# Patient Record
Sex: FEMALE
[De-identification: names, ages, dates, MRNs, and addresses within clinical notes are randomized per-mention and may not be internally consistent; named-entity substitution may affect disease eponyms.]

---

## 2021-09-14 ENCOUNTER — RESULT REVIEW (OUTPATIENT)
Age: 46
End: 2021-09-14

## 2022-10-20 ENCOUNTER — RESULT REVIEW (OUTPATIENT)
Age: 47
End: 2022-10-20

## 2022-11-07 PROBLEM — Z00.00 ENCOUNTER FOR PREVENTIVE HEALTH EXAMINATION: Status: ACTIVE | Noted: 2022-11-07

## 2022-11-11 ENCOUNTER — APPOINTMENT (OUTPATIENT)
Dept: ULTRASOUND IMAGING | Facility: CLINIC | Age: 47
End: 2022-11-11

## 2022-11-11 ENCOUNTER — OUTPATIENT (OUTPATIENT)
Dept: OUTPATIENT SERVICES | Facility: HOSPITAL | Age: 47
LOS: 1 days | End: 2022-11-11

## 2022-11-11 PROCEDURE — 76856 US EXAM PELVIC COMPLETE: CPT | Mod: 26

## 2022-11-11 PROCEDURE — 76830 TRANSVAGINAL US NON-OB: CPT | Mod: 26

## 2023-01-26 ENCOUNTER — ASOB RESULT (OUTPATIENT)
Age: 48
End: 2023-01-26

## 2023-01-26 ENCOUNTER — APPOINTMENT (OUTPATIENT)
Dept: ANTEPARTUM | Facility: CLINIC | Age: 48
End: 2023-01-26
Payer: COMMERCIAL

## 2023-01-26 PROCEDURE — 76376 3D RENDER W/INTRP POSTPROCES: CPT

## 2023-01-26 PROCEDURE — 76856 US EXAM PELVIC COMPLETE: CPT

## 2023-01-26 PROCEDURE — 76830 TRANSVAGINAL US NON-OB: CPT

## 2024-05-29 ENCOUNTER — APPOINTMENT (OUTPATIENT)
Dept: ORTHOPEDIC SURGERY | Facility: CLINIC | Age: 49
End: 2024-05-29
Payer: COMMERCIAL

## 2024-05-29 VITALS — RESPIRATION RATE: 16 BRPM | HEIGHT: 63 IN | WEIGHT: 145 LBS | BODY MASS INDEX: 25.69 KG/M2

## 2024-05-29 DIAGNOSIS — R22.31 LOCALIZED SWELLING, MASS AND LUMP, RIGHT UPPER LIMB: ICD-10-CM

## 2024-05-29 DIAGNOSIS — M65.4 RADIAL STYLOID TENOSYNOVITIS [DE QUERVAIN]: ICD-10-CM

## 2024-05-29 DIAGNOSIS — Z82.61 FAMILY HISTORY OF ARTHRITIS: ICD-10-CM

## 2024-05-29 DIAGNOSIS — Z87.891 PERSONAL HISTORY OF NICOTINE DEPENDENCE: ICD-10-CM

## 2024-05-29 PROCEDURE — 99204 OFFICE O/P NEW MOD 45 MIN: CPT | Mod: 25

## 2024-05-29 PROCEDURE — 76882 US LMTD JT/FCL EVL NVASC XTR: CPT

## 2024-05-29 PROCEDURE — 73110 X-RAY EXAM OF WRIST: CPT | Mod: 50

## 2024-05-29 PROCEDURE — 20612 ASPIRATE/INJ GANGLION CYST: CPT

## 2024-05-29 PROCEDURE — 20550 NJX 1 TENDON SHEATH/LIGAMENT: CPT | Mod: RT

## 2024-05-29 NOTE — PHYSICAL EXAM
[de-identified] : Physical exam shows the patient to be alert and oriented x3, capable of ambulation. The patient is well-developed and well-nourished in no apparent respiratory distress. Majority of the skin is intact bilaterally in the upper extremities without lymphadenopathy at the elbows.  There is a 5 x 5 mm soft tissue mass over the first dorsal compartment of the radial styloid of the right wrist.  There is a positive Finkelstein test but a negative grind test no tenderness over the STT or FCR tendon.  Symmetric and full range of motion of the wrist bilaterally without evidence of joint or tendon instability no tenderness over the MP or IP joint or A1 pulley.  There is good capillary refill of the digits bilaterally.There is no masses or sensitivity over the median and ulnar nerves at the level of the wrist. There is a negative Tinel's and negative Phalen's sign bilaterally. The sensation is grossly intact bilaterally. [de-identified] : PA lateral and obliques of both wrist shows no evidence of arthritis with joint spaces well-preserved and no soft tissue calcifications.  There is increased density over the radial side of the right wrist as compared to left.  Scapholunate ligament stress views in neutral position shows no evidence of carpal instability as compared to the opposite side.  Ultrasound shows a hypoechoic signal coming from the first dorsal compartment of the right wrist which is well encapsulated.

## 2024-05-29 NOTE — HISTORY OF PRESENT ILLNESS
[Right] : right hand dominant [FreeTextEntry1] : Patient presents with right radial wrist pain which started about 8 weeks. She denies any injury but states that she started weight lifting a few weeks ago and believes that this may be the cause of her wrist pain.

## 2024-05-29 NOTE — ASSESSMENT
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  Differential diagnosis was discussed as well as options ranging from conservative management, aspiration and injections, or surgical decompression.  Risk associated with each option discussed including risk of bleeding infection nerve injury tendon injury stiffness pain syndrome instability incomplete resolution of symptoms recurrence tissue loss functional loss need further treatment and surgery and other issues associated with both conservative and more aggressive forms of treatment.  Patient appeared understand these risks and all questions were answered.  She elected to proceed with aspiration of the mass as well as injection of the first dorsal compartment.  After the area was cleaned with alcohol to reduce risk of infection a 20-gauge needle was utilized to aspirate the mass with resolution.  1 cc of Kenalog 10 and 1 cc of 2% lidocaine was placed into the first dorsal compartment.  Hemostasis was obtained by direct pressure and a Band-Aid applied and home program was elected over formal therapy.  It is hoped that this has a long-lasting beneficial therapeutic effect. If symptoms are not fully resolved by 4-6 weeks the patient was asked to return to my office for reevaluation. If symptoms are resolved they can return on an as-needed basis.